# Patient Record
Sex: FEMALE | Race: WHITE | NOT HISPANIC OR LATINO | Employment: UNEMPLOYED | URBAN - NONMETROPOLITAN AREA
[De-identification: names, ages, dates, MRNs, and addresses within clinical notes are randomized per-mention and may not be internally consistent; named-entity substitution may affect disease eponyms.]

---

## 2017-02-14 ENCOUNTER — TRANSCRIBE ORDERS (OUTPATIENT)
Dept: ADMINISTRATIVE | Facility: HOSPITAL | Age: 82
End: 2017-02-14

## 2017-02-14 DIAGNOSIS — E04.9 ENLARGEMENT OF THYROID: Primary | ICD-10-CM

## 2017-02-27 ENCOUNTER — HOSPITAL ENCOUNTER (OUTPATIENT)
Dept: ULTRASOUND IMAGING | Facility: HOSPITAL | Age: 82
Discharge: HOME | End: 2017-02-27
Attending: INTERNAL MEDICINE | Admitting: INTERNAL MEDICINE

## 2017-02-27 DIAGNOSIS — E04.9 ENLARGEMENT OF THYROID: ICD-10-CM

## 2017-02-27 PROCEDURE — 76536 US EXAM OF HEAD AND NECK: CPT

## 2017-02-27 PROCEDURE — 76536 US EXAM OF HEAD AND NECK: CPT | Performed by: RADIOLOGY

## 2017-10-24 DIAGNOSIS — M25.551 HIP PAIN, RIGHT: Primary | ICD-10-CM

## 2017-10-25 ENCOUNTER — HOSPITAL ENCOUNTER (OUTPATIENT)
Dept: GENERAL RADIOLOGY | Facility: HOSPITAL | Age: 82
Discharge: HOME | End: 2017-10-25
Attending: ORTHOPAEDIC SURGERY | Admitting: ORTHOPAEDIC SURGERY

## 2017-10-25 ENCOUNTER — OFFICE VISIT (OUTPATIENT)
Dept: ORTHOPEDIC SURGERY | Facility: CLINIC | Age: 82
End: 2017-10-25

## 2017-10-25 VITALS
BODY MASS INDEX: 35 KG/M2 | HEART RATE: 73 BPM | WEIGHT: 205 LBS | DIASTOLIC BLOOD PRESSURE: 79 MMHG | SYSTOLIC BLOOD PRESSURE: 140 MMHG | HEIGHT: 64 IN

## 2017-10-25 DIAGNOSIS — M25.551 HIP PAIN, RIGHT: ICD-10-CM

## 2017-10-25 DIAGNOSIS — M65.331 TRIGGER MIDDLE FINGER OF RIGHT HAND: Primary | ICD-10-CM

## 2017-10-25 DIAGNOSIS — M79.641 RIGHT HAND PAIN: ICD-10-CM

## 2017-10-25 DIAGNOSIS — Z96.641 HISTORY OF HIP REPLACEMENT, TOTAL, RIGHT: ICD-10-CM

## 2017-10-25 PROCEDURE — 99214 OFFICE O/P EST MOD 30 MIN: CPT | Performed by: ORTHOPAEDIC SURGERY

## 2017-10-25 PROCEDURE — 73502 X-RAY EXAM HIP UNI 2-3 VIEWS: CPT | Performed by: RADIOLOGY

## 2017-10-25 PROCEDURE — 73502 X-RAY EXAM HIP UNI 2-3 VIEWS: CPT

## 2017-10-25 RX ORDER — MONTELUKAST SODIUM 10 MG/1
10 TABLET ORAL NIGHTLY
COMMUNITY
Start: 2017-10-10

## 2017-10-25 RX ORDER — SERTRALINE HYDROCHLORIDE 25 MG/1
TABLET, FILM COATED ORAL
COMMUNITY
Start: 2017-10-10 | End: 2017-10-31

## 2017-10-25 RX ORDER — POTASSIUM CHLORIDE 750 MG/1
CAPSULE, EXTENDED RELEASE ORAL
COMMUNITY
Start: 2017-10-10 | End: 2017-10-31

## 2017-10-25 RX ORDER — ASPIRIN 81 MG/1
81 TABLET ORAL DAILY
COMMUNITY

## 2017-10-25 RX ORDER — SODIUM CHLORIDE, SODIUM LACTATE, POTASSIUM CHLORIDE, CALCIUM CHLORIDE 600; 310; 30; 20 MG/100ML; MG/100ML; MG/100ML; MG/100ML
75 INJECTION, SOLUTION INTRAVENOUS CONTINUOUS
Status: CANCELLED | OUTPATIENT
Start: 2017-10-25

## 2017-10-25 NOTE — PROGRESS NOTES
History and Physical    Patient: Chanell Allen  YOB: 1932  Date of encounter: 10/25/2017      History of Present Illness:   Chanell Allen is a 85 y.o. female seen today for pain in her right hip and groin following a fall about a month ago. She does report gradual improvement and denies any hip pain today. She has a history of right total hip arthroplasty in April 2013.     Her second complaint today is pain and locking of her right middle finger.  She has had symptoms for over 6 months progressively worsening.  She has had multiple trigger finger releases in the past always with good result.  She is currently on Plavix and Aspirin 81mg.     PMH:   Patient Active Problem List   Diagnosis   • Coronary artery disease   • HLD (hyperlipidemia)   • BP (high blood pressure)   • Adult hypothyroidism     Past Medical History:   Diagnosis Date   • Acid reflux    • CHF (congestive heart failure)    • COPD (chronic obstructive pulmonary disease)    • Diabetes    • History of transfusion    • Hypertension    • Osteoarthritis    • Rheumatoid arthritis    • Stroke     tia   • Thyroid disease      PSH:  Past Surgical History:   Procedure Laterality Date   • ABDOMINAL SURGERY     • CARPAL TUNNEL RELEASE     • CHOLECYSTECTOMY     • EYE SURGERY Bilateral     cataracts   • HIATAL HERNIA REPAIR     • HYSTERECTOMY     • JOINT REPLACEMENT     • SKIN BIOPSY     • THYROID SURGERY     • TOTAL HIP ARTHROPLASTY     • TRIGGER FINGER RELEASE Right 10/13/2016    Procedure: RIGHT FOURTH TRIGGER FINGER RELEASE;  Surgeon: Edwin Solis MD;  Location: [facility];  Service:        Allergies:  Allergies   Allergen Reactions   • Ciprofloxacin Hcl Other (See Comments)     Pt states doesn't remember       Medications:     Current Outpatient Prescriptions:   •  aspirin 81 MG EC tablet, Take 81 mg by mouth Daily., Disp: , Rfl:   •  clopidogrel (PLAVIX) 75 MG tablet, 75 mg Daily., Disp: , Rfl:   •  COREG CR 20 MG 24 hr capsule, 20  "mg Daily., Disp: , Rfl:   •  flecainide (TAMBOCOR) 50 MG tablet, 50 mg 2 (Two) Times a Day., Disp: , Rfl:   •  losartan (COZAAR) 100 MG tablet, 100 mg Daily., Disp: , Rfl:   •  montelukast (SINGULAIR) 10 MG tablet, , Disp: , Rfl:   •  SYNTHROID 175 MCG tablet, 175 mcg Daily., Disp: , Rfl:   •  HYDROcodone-acetaminophen (NORCO) 7.5-325 MG per tablet, Take 1 tablet by mouth Every 4 (Four) Hours As Needed for moderate pain (4-6) (Pain)., Disp: 12 tablet, Rfl: 0  •  potassium chloride (MICRO-K) 10 MEQ CR capsule, , Disp: , Rfl:   •  sertraline (ZOLOFT) 25 MG tablet, , Disp: , Rfl:   •  spironolactone (ALDACTONE) 25 MG tablet, 12.5 mg Daily., Disp: , Rfl:     Social History:  Social History     Occupational History   • Not on file.     Social History Main Topics   • Smoking status: Never Smoker   • Smokeless tobacco: Never Used   • Alcohol use No   • Drug use: No   • Sexual activity: Defer      Social History     Social History Narrative       Family History:  Family History   Problem Relation Age of Onset   • Osteoporosis Mother    • Rheumatologic disease Sister    • Heart disease Maternal Grandfather        Review of Systems:   Review of Systems   Constitutional: Positive for activity change and fatigue.   HENT: Negative.    Eyes: Negative.    Respiratory: Positive for shortness of breath.    Cardiovascular: Positive for chest pain, palpitations and leg swelling.   Gastrointestinal: Positive for diarrhea.   Endocrine: Negative.    Genitourinary: Negative.    Musculoskeletal: Positive for back pain, gait problem, joint swelling and myalgias.   Skin: Negative.    Allergic/Immunologic: Negative.    Hematological: Bruises/bleeds easily.   Psychiatric/Behavioral: The patient is nervous/anxious.        Physical Exam:   General Appearance:    85 y.o. female  cooperative, in no acute distress.  Alert and oriented x 3,                   Vitals:    10/25/17 1451   BP: 140/79   Pulse: 73   Weight: 205 lb (93 kg)   Height: 64\" " (162.6 cm)          HEENT: Unremarkable      Neck: Supple without lymphadenopathy.      Chest: Clear to ascultation bilaterally. Normal respiratory effort.      Heart: Regular rate and rhythm. No murmurs noted.      Skin:  Warm and dry without any rash.      Musculoskeletal:  Upper Extremities: Right hand middle finger   Lower Extremities: Right hip examination     Radiology:     XR HIP WITH OR WITHOUT PELVIS 2-3 VIEWS RIGHT-      REASON FOR EXAM:  Right hip pain; M25.551-Pain in right hip.      COMPARISON: Today's exam is compared with a previous study from  05/18/2016.      FINDINGS:  A hip prosthesis is noted on the right. The alignment is  stable. There were no fractures or dislocations. There were no plain  film findings of loosening of the prosthesis.      IMPRESSION:  Negative postoperative right hip.          This report was finalized on 10/25/2017 2:32 PM by Dr. Manny Felipe II, MD.    Assessment:  85-year-old female with right third trigger finger of about 6 months duration.  I'm doubtful she will get complete response long term with steroid injection.  She prefers to have surgical release.    ICD-10-CM ICD-9-CM   1. Trigger middle finger of right hand M65.331 727.03   2. Right hand pain M79.641 729.5   3. Hip pain, right M25.551 719.45   4. History of hip replacement, total, right Z96.641 V43.64       Plan:  She will be scheduled at [facility] on 11/02/2017 for right middle trigger finger release under local anesthesia.     This document was signed by Edwin Solis MD.  10/25/31700:35 PM    Cc: Stella Khan MD

## 2017-10-26 ENCOUNTER — TELEPHONE (OUTPATIENT)
Dept: ORTHOPEDIC SURGERY | Facility: CLINIC | Age: 82
End: 2017-10-26

## 2017-10-29 ENCOUNTER — PREP FOR SURGERY (OUTPATIENT)
Dept: OTHER | Facility: HOSPITAL | Age: 82
End: 2017-10-29

## 2017-10-30 NOTE — H&P
History and Physical    Patient: Chanell Allen  YOB: 1932  Date of encounter: 10/25/2017      History of Present Illness:   Chanell Allen is a 85 y.o. female seen today for pain in her right hip and groin following a fall about a month ago. She does report gradual improvement and denies any hip pain today. She has a history of right total hip arthroplasty in April 2013.     Her second complaint today is pain and locking of her right middle finger.  She has had symptoms for over 6 months progressively worsening.  She has had multiple trigger finger releases in the past always with good result.  She is currently on Plavix and Aspirin 81mg.     PMH:   Patient Active Problem List   Diagnosis   • Coronary artery disease   • HLD (hyperlipidemia)   • BP (high blood pressure)   • Adult hypothyroidism     Past Medical History:   Diagnosis Date   • Acid reflux    • CHF (congestive heart failure)    • COPD (chronic obstructive pulmonary disease)    • Diabetes    • History of transfusion    • Hypertension    • Osteoarthritis    • Rheumatoid arthritis    • Stroke     tia   • Thyroid disease      PSH:  Past Surgical History:   Procedure Laterality Date   • ABDOMINAL SURGERY     • CARPAL TUNNEL RELEASE     • CHOLECYSTECTOMY     • EYE SURGERY Bilateral     cataracts   • HIATAL HERNIA REPAIR     • HYSTERECTOMY     • JOINT REPLACEMENT     • SKIN BIOPSY     • THYROID SURGERY     • TOTAL HIP ARTHROPLASTY     • TRIGGER FINGER RELEASE Right 10/13/2016    Procedure: RIGHT FOURTH TRIGGER FINGER RELEASE;  Surgeon: Edwin Solis MD;  Location[facility];  Service:        Allergies:  Allergies   Allergen Reactions   • Ciprofloxacin Hcl Other (See Comments)     Pt states doesn't remember       Medications:     Current Outpatient Prescriptions:   •  aspirin 81 MG EC tablet, Take 81 mg by mouth Daily., Disp: , Rfl:   •  clopidogrel (PLAVIX) 75 MG tablet, 75 mg Daily., Disp: , Rfl:   •  COREG CR 20 MG 24 hr capsule, 20  "mg Daily., Disp: , Rfl:   •  flecainide (TAMBOCOR) 50 MG tablet, 50 mg 2 (Two) Times a Day., Disp: , Rfl:   •  losartan (COZAAR) 100 MG tablet, 100 mg Daily., Disp: , Rfl:   •  montelukast (SINGULAIR) 10 MG tablet, , Disp: , Rfl:   •  SYNTHROID 175 MCG tablet, 175 mcg Daily., Disp: , Rfl:   •  HYDROcodone-acetaminophen (NORCO) 7.5-325 MG per tablet, Take 1 tablet by mouth Every 4 (Four) Hours As Needed for moderate pain (4-6) (Pain)., Disp: 12 tablet, Rfl: 0  •  potassium chloride (MICRO-K) 10 MEQ CR capsule, , Disp: , Rfl:   •  sertraline (ZOLOFT) 25 MG tablet, , Disp: , Rfl:   •  spironolactone (ALDACTONE) 25 MG tablet, 12.5 mg Daily., Disp: , Rfl:     Social History:  Social History     Occupational History   • Not on file.     Social History Main Topics   • Smoking status: Never Smoker   • Smokeless tobacco: Never Used   • Alcohol use No   • Drug use: No   • Sexual activity: Defer      Social History     Social History Narrative       Family History:  Family History   Problem Relation Age of Onset   • Osteoporosis Mother    • Rheumatologic disease Sister    • Heart disease Maternal Grandfather        Review of Systems:   Review of Systems   Constitutional: Positive for activity change and fatigue.   HENT: Negative.    Eyes: Negative.    Respiratory: Positive for shortness of breath.    Cardiovascular: Positive for chest pain, palpitations and leg swelling.   Gastrointestinal: Positive for diarrhea.   Endocrine: Negative.    Genitourinary: Negative.    Musculoskeletal: Positive for back pain, gait problem, joint swelling and myalgias.   Skin: Negative.    Allergic/Immunologic: Negative.    Hematological: Bruises/bleeds easily.   Psychiatric/Behavioral: The patient is nervous/anxious.        Physical Exam:   General Appearance:    85 y.o. female  cooperative, in no acute distress.  Alert and oriented x 3,                   Vitals:    10/25/17 1451   BP: 140/79   Pulse: 73   Weight: 205 lb (93 kg)   Height: 64\" " (162.6 cm)          HEENT: Unremarkable      Neck: Supple without lymphadenopathy.      Chest: Clear to ascultation bilaterally. Normal respiratory effort.      Heart: Regular rate and rhythm. No murmurs noted.      Skin:  Warm and dry without any rash.      Musculoskeletal:  Upper Extremities: Right hand middle finger   Lower Extremities: Right hip examination     Radiology:     XR HIP WITH OR WITHOUT PELVIS 2-3 VIEWS RIGHT-      REASON FOR EXAM:  Right hip pain; M25.551-Pain in right hip.      COMPARISON: Today's exam is compared with a previous study from  05/18/2016.      FINDINGS:  A hip prosthesis is noted on the right. The alignment is  stable. There were no fractures or dislocations. There were no plain  film findings of loosening of the prosthesis.      IMPRESSION:  Negative postoperative right hip.          This report was finalized on 10/25/2017 2:32 PM by Dr. Manny Felipe II, MD.    Assessment:  85-year-old female with right third trigger finger of about 6 months duration.  I'm doubtful she will get complete response long term with steroid injection.  She prefers to have surgical release.    ICD-10-CM ICD-9-CM   1. Trigger middle finger of right hand M65.331 727.03   2. Right hand pain M79.641 729.5   3. Hip pain, right M25.551 719.45   4. History of hip replacement, total, right Z96.641 V43.64       Plan:  She will be scheduled at [facility] on 11/02/2017 for right middle trigger finger release under local anesthesia.     This document was signed by Edwin Solis MD.  10/25/45154:35 PM    Cc: Stella Khan MD

## 2017-10-31 ENCOUNTER — APPOINTMENT (OUTPATIENT)
Dept: PREADMISSION TESTING | Facility: HOSPITAL | Age: 82
End: 2017-10-31

## 2017-10-31 LAB
ANION GAP SERPL CALCULATED.3IONS-SCNC: 5.7 MMOL/L (ref 3.6–11.2)
BUN BLD-MCNC: 16 MG/DL (ref 7–21)
BUN/CREAT SERPL: 18.8 (ref 7–25)
CALCIUM SPEC-SCNC: 9.6 MG/DL (ref 7.7–10)
CHLORIDE SERPL-SCNC: 110 MMOL/L (ref 99–112)
CO2 SERPL-SCNC: 27.3 MMOL/L (ref 24.3–31.9)
CREAT BLD-MCNC: 0.85 MG/DL (ref 0.43–1.29)
DEPRECATED RDW RBC AUTO: 41.3 FL (ref 37–54)
ERYTHROCYTE [DISTWIDTH] IN BLOOD BY AUTOMATED COUNT: 12.7 % (ref 11.5–14.5)
GFR SERPL CREATININE-BSD FRML MDRD: 64 ML/MIN/1.73
GLUCOSE BLD-MCNC: 95 MG/DL (ref 70–110)
HCT VFR BLD AUTO: 39.5 % (ref 37–47)
HGB BLD-MCNC: 12.7 G/DL (ref 12–16)
MCH RBC QN AUTO: 29.5 PG (ref 27–33)
MCHC RBC AUTO-ENTMCNC: 32.2 G/DL (ref 33–37)
MCV RBC AUTO: 91.9 FL (ref 80–94)
OSMOLALITY SERPL CALC.SUM OF ELEC: 286 MOSM/KG (ref 273–305)
PLATELET # BLD AUTO: 255 10*3/MM3 (ref 130–400)
PMV BLD AUTO: 10.6 FL (ref 6–10)
POTASSIUM BLD-SCNC: 4.3 MMOL/L (ref 3.5–5.3)
RBC # BLD AUTO: 4.3 10*6/MM3 (ref 4.2–5.4)
SODIUM BLD-SCNC: 143 MMOL/L (ref 135–153)
WBC NRBC COR # BLD: 5.78 10*3/MM3 (ref 4.5–12.5)

## 2017-10-31 PROCEDURE — 36415 COLL VENOUS BLD VENIPUNCTURE: CPT

## 2017-10-31 PROCEDURE — 80048 BASIC METABOLIC PNL TOTAL CA: CPT | Performed by: ORTHOPAEDIC SURGERY

## 2017-10-31 PROCEDURE — 85027 COMPLETE CBC AUTOMATED: CPT | Performed by: ORTHOPAEDIC SURGERY

## 2017-10-31 RX ORDER — ERGOCALCIFEROL 1.25 MG/1
50000 CAPSULE ORAL WEEKLY
COMMUNITY

## 2017-11-01 ENCOUNTER — TELEPHONE (OUTPATIENT)
Dept: ORTHOPEDIC SURGERY | Facility: CLINIC | Age: 82
End: 2017-11-01

## 2017-11-01 NOTE — TELEPHONE ENCOUNTER
Patient was notified with surgery arrival time 11/2/17@ 6:30a.m. Patient verbalized understanding.

## 2017-11-02 ENCOUNTER — ANESTHESIA (OUTPATIENT)
Dept: PERIOP | Facility: HOSPITAL | Age: 82
End: 2017-11-02

## 2017-11-02 ENCOUNTER — HOSPITAL ENCOUNTER (OUTPATIENT)
Facility: HOSPITAL | Age: 82
Setting detail: HOSPITAL OUTPATIENT SURGERY
Discharge: HOME | End: 2017-11-02
Attending: ORTHOPAEDIC SURGERY | Admitting: ORTHOPAEDIC SURGERY

## 2017-11-02 ENCOUNTER — ANESTHESIA EVENT (OUTPATIENT)
Dept: PERIOP | Facility: HOSPITAL | Age: 82
End: 2017-11-02

## 2017-11-02 VITALS
DIASTOLIC BLOOD PRESSURE: 64 MMHG | OXYGEN SATURATION: 96 % | HEIGHT: 64 IN | BODY MASS INDEX: 35 KG/M2 | WEIGHT: 205 LBS | TEMPERATURE: 99.3 F | RESPIRATION RATE: 16 BRPM | SYSTOLIC BLOOD PRESSURE: 118 MMHG | HEART RATE: 58 BPM

## 2017-11-02 DIAGNOSIS — M79.641 RIGHT HAND PAIN: ICD-10-CM

## 2017-11-02 DIAGNOSIS — M65.331 TRIGGER MIDDLE FINGER OF RIGHT HAND: ICD-10-CM

## 2017-11-02 LAB — GLUCOSE BLDC GLUCOMTR-MCNC: 104 MG/DL (ref 70–130)

## 2017-11-02 PROCEDURE — 25010000002 PROPOFOL 1000 MG/ML EMULSION: Performed by: NURSE ANESTHETIST, CERTIFIED REGISTERED

## 2017-11-02 PROCEDURE — 26055 INCISE FINGER TENDON SHEATH: CPT | Performed by: ORTHOPAEDIC SURGERY

## 2017-11-02 PROCEDURE — 82962 GLUCOSE BLOOD TEST: CPT

## 2017-11-02 PROCEDURE — 25010000002 ONDANSETRON PER 1 MG: Performed by: NURSE ANESTHETIST, CERTIFIED REGISTERED

## 2017-11-02 PROCEDURE — 25010000002 FENTANYL CITRATE (PF) 100 MCG/2ML SOLUTION: Performed by: NURSE ANESTHETIST, CERTIFIED REGISTERED

## 2017-11-02 PROCEDURE — 25010000002 PROPOFOL 10 MG/ML EMULSION: Performed by: NURSE ANESTHETIST, CERTIFIED REGISTERED

## 2017-11-02 RX ORDER — FENTANYL CITRATE 50 UG/ML
INJECTION, SOLUTION INTRAMUSCULAR; INTRAVENOUS AS NEEDED
Status: DISCONTINUED | OUTPATIENT
Start: 2017-11-02 | End: 2017-11-02 | Stop reason: SURG

## 2017-11-02 RX ORDER — SODIUM CHLORIDE, SODIUM LACTATE, POTASSIUM CHLORIDE, CALCIUM CHLORIDE 600; 310; 30; 20 MG/100ML; MG/100ML; MG/100ML; MG/100ML
75 INJECTION, SOLUTION INTRAVENOUS CONTINUOUS
Status: DISCONTINUED | OUTPATIENT
Start: 2017-11-02 | End: 2017-11-02 | Stop reason: HOSPADM

## 2017-11-02 RX ORDER — SODIUM CHLORIDE, SODIUM LACTATE, POTASSIUM CHLORIDE, CALCIUM CHLORIDE 600; 310; 30; 20 MG/100ML; MG/100ML; MG/100ML; MG/100ML
125 INJECTION, SOLUTION INTRAVENOUS CONTINUOUS
Status: DISCONTINUED | OUTPATIENT
Start: 2017-11-02 | End: 2017-11-02 | Stop reason: HOSPADM

## 2017-11-02 RX ORDER — MEPERIDINE HYDROCHLORIDE 25 MG/ML
12.5 INJECTION INTRAMUSCULAR; INTRAVENOUS; SUBCUTANEOUS
Status: DISCONTINUED | OUTPATIENT
Start: 2017-11-02 | End: 2017-11-02 | Stop reason: HOSPADM

## 2017-11-02 RX ORDER — IPRATROPIUM BROMIDE AND ALBUTEROL SULFATE 2.5; .5 MG/3ML; MG/3ML
3 SOLUTION RESPIRATORY (INHALATION) ONCE AS NEEDED
Status: DISCONTINUED | OUTPATIENT
Start: 2017-11-02 | End: 2017-11-02 | Stop reason: HOSPADM

## 2017-11-02 RX ORDER — ONDANSETRON 2 MG/ML
INJECTION INTRAMUSCULAR; INTRAVENOUS AS NEEDED
Status: DISCONTINUED | OUTPATIENT
Start: 2017-11-02 | End: 2017-11-02 | Stop reason: SURG

## 2017-11-02 RX ORDER — SODIUM CHLORIDE 0.9 % (FLUSH) 0.9 %
1-10 SYRINGE (ML) INJECTION AS NEEDED
Status: DISCONTINUED | OUTPATIENT
Start: 2017-11-02 | End: 2017-11-02 | Stop reason: HOSPADM

## 2017-11-02 RX ORDER — LIDOCAINE HYDROCHLORIDE 10 MG/ML
INJECTION, SOLUTION INFILTRATION; PERINEURAL AS NEEDED
Status: DISCONTINUED | OUTPATIENT
Start: 2017-11-02 | End: 2017-11-02 | Stop reason: HOSPADM

## 2017-11-02 RX ORDER — PROPOFOL 10 MG/ML
VIAL (ML) INTRAVENOUS AS NEEDED
Status: DISCONTINUED | OUTPATIENT
Start: 2017-11-02 | End: 2017-11-02 | Stop reason: SURG

## 2017-11-02 RX ORDER — BUPIVACAINE HYDROCHLORIDE 5 MG/ML
INJECTION, SOLUTION PERINEURAL AS NEEDED
Status: DISCONTINUED | OUTPATIENT
Start: 2017-11-02 | End: 2017-11-02 | Stop reason: HOSPADM

## 2017-11-02 RX ORDER — MAGNESIUM HYDROXIDE 1200 MG/15ML
LIQUID ORAL AS NEEDED
Status: DISCONTINUED | OUTPATIENT
Start: 2017-11-02 | End: 2017-11-02 | Stop reason: HOSPADM

## 2017-11-02 RX ORDER — FAMOTIDINE 10 MG/ML
INJECTION, SOLUTION INTRAVENOUS AS NEEDED
Status: DISCONTINUED | OUTPATIENT
Start: 2017-11-02 | End: 2017-11-02 | Stop reason: SURG

## 2017-11-02 RX ORDER — ONDANSETRON 2 MG/ML
4 INJECTION INTRAMUSCULAR; INTRAVENOUS ONCE AS NEEDED
Status: DISCONTINUED | OUTPATIENT
Start: 2017-11-02 | End: 2017-11-02 | Stop reason: HOSPADM

## 2017-11-02 RX ORDER — HYDROCODONE BITARTRATE AND ACETAMINOPHEN 5; 325 MG/1; MG/1
1 TABLET ORAL EVERY 4 HOURS PRN
Qty: 12 TABLET | Refills: 0 | Status: SHIPPED | OUTPATIENT
Start: 2017-11-02 | End: 2018-10-08

## 2017-11-02 RX ORDER — OXYCODONE HYDROCHLORIDE AND ACETAMINOPHEN 5; 325 MG/1; MG/1
1 TABLET ORAL ONCE AS NEEDED
Status: DISCONTINUED | OUTPATIENT
Start: 2017-11-02 | End: 2017-11-02 | Stop reason: HOSPADM

## 2017-11-02 RX ORDER — FENTANYL CITRATE 50 UG/ML
50 INJECTION, SOLUTION INTRAMUSCULAR; INTRAVENOUS
Status: DISCONTINUED | OUTPATIENT
Start: 2017-11-02 | End: 2017-11-02 | Stop reason: HOSPADM

## 2017-11-02 RX ADMIN — PROPOFOL 50 MG: 10 INJECTION, EMULSION INTRAVENOUS at 07:36

## 2017-11-02 RX ADMIN — FENTANYL CITRATE 50 MCG: 50 INJECTION INTRAMUSCULAR; INTRAVENOUS at 07:36

## 2017-11-02 RX ADMIN — PROPOFOL 100 MCG/KG/MIN: 10 INJECTION, EMULSION INTRAVENOUS at 07:36

## 2017-11-02 RX ADMIN — ONDANSETRON 4 MG: 2 INJECTION, SOLUTION INTRAMUSCULAR; INTRAVENOUS at 07:29

## 2017-11-02 RX ADMIN — FAMOTIDINE 20 MG: 10 INJECTION, SOLUTION INTRAVENOUS at 07:29

## 2017-11-02 RX ADMIN — FENTANYL CITRATE 50 MCG: 50 INJECTION INTRAMUSCULAR; INTRAVENOUS at 07:29

## 2017-11-02 RX ADMIN — SODIUM CHLORIDE, POTASSIUM CHLORIDE, SODIUM LACTATE AND CALCIUM CHLORIDE 75 ML/HR: 600; 310; 30; 20 INJECTION, SOLUTION INTRAVENOUS at 07:17

## 2017-11-02 NOTE — PLAN OF CARE
Problem: Patient Care Overview (Adult)  Goal: Discharge Needs Assessment  Outcome: Ongoing (interventions implemented as appropriate)    11/02/17 0636   Discharge Needs Assessment   Concerns To Be Addressed no discharge needs identified   Readmission Within The Last 30 Days no previous admission in last 30 days   Equipment Needed After Discharge none   Discharge Disposition home or self-care   Current Health   Anticipated Changes Related to Illness none   Self-Care   Equipment Currently Used at Home none   Living Environment   Transportation Available none

## 2017-11-02 NOTE — H&P (VIEW-ONLY)
History and Physical    Patient: Chanell Allen  YOB: 1932  Date of encounter: 10/25/2017      History of Present Illness:   Chanell Allen is a 85 y.o. female seen today for pain in her right hip and groin following a fall about a month ago. She does report gradual improvement and denies any hip pain today. She has a history of right total hip arthroplasty in April 2013.     Her second complaint today is pain and locking of her right middle finger.  She has had symptoms for over 6 months progressively worsening.  She has had multiple trigger finger releases in the past always with good result.  She is currently on Plavix and Aspirin 81mg.     PMH:   Patient Active Problem List   Diagnosis   • Coronary artery disease   • HLD (hyperlipidemia)   • BP (high blood pressure)   • Adult hypothyroidism     Past Medical History:   Diagnosis Date   • Acid reflux    • CHF (congestive heart failure)    • COPD (chronic obstructive pulmonary disease)    • Diabetes    • History of transfusion    • Hypertension    • Osteoarthritis    • Rheumatoid arthritis    • Stroke     tia   • Thyroid disease      PSH:  Past Surgical History:   Procedure Laterality Date   • ABDOMINAL SURGERY     • CARPAL TUNNEL RELEASE     • CHOLECYSTECTOMY     • EYE SURGERY Bilateral     cataracts   • HIATAL HERNIA REPAIR     • HYSTERECTOMY     • JOINT REPLACEMENT     • SKIN BIOPSY     • THYROID SURGERY     • TOTAL HIP ARTHROPLASTY     • TRIGGER FINGER RELEASE Right 10/13/2016    Procedure: RIGHT FOURTH TRIGGER FINGER RELEASE;  Surgeon: Edwin Solis MD;  Location: [facility];  Service:        Allergies:  Allergies   Allergen Reactions   • Ciprofloxacin Hcl Other (See Comments)     Pt states doesn't remember       Medications:     Current Outpatient Prescriptions:   •  aspirin 81 MG EC tablet, Take 81 mg by mouth Daily., Disp: , Rfl:   •  clopidogrel (PLAVIX) 75 MG tablet, 75 mg Daily., Disp: , Rfl:   •  COREG CR 20 MG 24 hr capsule, 20  "mg Daily., Disp: , Rfl:   •  flecainide (TAMBOCOR) 50 MG tablet, 50 mg 2 (Two) Times a Day., Disp: , Rfl:   •  losartan (COZAAR) 100 MG tablet, 100 mg Daily., Disp: , Rfl:   •  montelukast (SINGULAIR) 10 MG tablet, , Disp: , Rfl:   •  SYNTHROID 175 MCG tablet, 175 mcg Daily., Disp: , Rfl:   •  HYDROcodone-acetaminophen (NORCO) 7.5-325 MG per tablet, Take 1 tablet by mouth Every 4 (Four) Hours As Needed for moderate pain (4-6) (Pain)., Disp: 12 tablet, Rfl: 0  •  potassium chloride (MICRO-K) 10 MEQ CR capsule, , Disp: , Rfl:   •  sertraline (ZOLOFT) 25 MG tablet, , Disp: , Rfl:   •  spironolactone (ALDACTONE) 25 MG tablet, 12.5 mg Daily., Disp: , Rfl:     Social History:  Social History     Occupational History   • Not on file.     Social History Main Topics   • Smoking status: Never Smoker   • Smokeless tobacco: Never Used   • Alcohol use No   • Drug use: No   • Sexual activity: Defer      Social History     Social History Narrative       Family History:  Family History   Problem Relation Age of Onset   • Osteoporosis Mother    • Rheumatologic disease Sister    • Heart disease Maternal Grandfather        Review of Systems:   Review of Systems   Constitutional: Positive for activity change and fatigue.   HENT: Negative.    Eyes: Negative.    Respiratory: Positive for shortness of breath.    Cardiovascular: Positive for chest pain, palpitations and leg swelling.   Gastrointestinal: Positive for diarrhea.   Endocrine: Negative.    Genitourinary: Negative.    Musculoskeletal: Positive for back pain, gait problem, joint swelling and myalgias.   Skin: Negative.    Allergic/Immunologic: Negative.    Hematological: Bruises/bleeds easily.   Psychiatric/Behavioral: The patient is nervous/anxious.        Physical Exam:   General Appearance:    85 y.o. female  cooperative, in no acute distress.  Alert and oriented x 3,                   Vitals:    10/25/17 1451   BP: 140/79   Pulse: 73   Weight: 205 lb (93 kg)   Height: 64\" " (162.6 cm)          HEENT: Unremarkable      Neck: Supple without lymphadenopathy.      Chest: Clear to ascultation bilaterally. Normal respiratory effort.      Heart: Regular rate and rhythm. No murmurs noted.      Skin:  Warm and dry without any rash.      Musculoskeletal:  Upper Extremities: Right hand middle finger   Lower Extremities: Right hip examination     Radiology:     XR HIP WITH OR WITHOUT PELVIS 2-3 VIEWS RIGHT-      REASON FOR EXAM:  Right hip pain; M25.551-Pain in right hip.      COMPARISON: Today's exam is compared with a previous study from  05/18/2016.      FINDINGS:  A hip prosthesis is noted on the right. The alignment is  stable. There were no fractures or dislocations. There were no plain  film findings of loosening of the prosthesis.      IMPRESSION:  Negative postoperative right hip.          This report was finalized on 10/25/2017 2:32 PM by Dr. Manny Felipe II, MD.    Assessment:  85-year-old female with right third trigger finger of about 6 months duration.  I'm doubtful she will get complete response long term with steroid injection.  She prefers to have surgical release.    ICD-10-CM ICD-9-CM   1. Trigger middle finger of right hand M65.331 727.03   2. Right hand pain M79.641 729.5   3. Hip pain, right M25.551 719.45   4. History of hip replacement, total, right Z96.641 V43.64       Plan:  She will be scheduled at [facility] on 11/02/2017 for right middle trigger finger release under local anesthesia.     This document was signed by Edwin Solis MD.  10/25/24127:35 PM    Cc: Stella Khan MD

## 2017-11-02 NOTE — PLAN OF CARE
Problem: Patient Care Overview (Adult)  Goal: Plan of Care Review  Outcome: Ongoing (interventions implemented as appropriate)    11/02/17 0637   Coping/Psychosocial Response Interventions   Plan Of Care Reviewed With patient   Patient Care Overview   Progress progress toward functional goals as expected

## 2017-11-02 NOTE — PLAN OF CARE
Problem: Perioperative Period (Adult)  Goal: Signs and Symptoms of Listed Potential Problems Will be Absent or Manageable (Perioperative Period)  Outcome: Ongoing (interventions implemented as appropriate)    11/02/17 0635   Perioperative Period   Problems Assessed (Perioperative Period) pain   Problems Present (Perioperative Period) none

## 2017-11-02 NOTE — ANESTHESIA PREPROCEDURE EVALUATION
Anesthesia Evaluation     Patient summary reviewed and Nursing notes reviewed   no history of anesthetic complications:  NPO Solid Status: > 8 hours  NPO Liquid Status: > 8 hours     Airway   Mallampati: II  TM distance: >3 FB  Neck ROM: full  no difficulty expected  Dental - normal exam   (+) edentulous, upper dentures and lower dentures    Pulmonary - normal exam    breath sounds clear to auscultation  (+) COPD,   (-) asthma, shortness of breath, not a smoker  Cardiovascular - normal exam  Exercise tolerance: good (4-7 METS)    NYHA Classification: II  Rhythm: regular  Rate: normal    (+) hypertension well controlled, CAD, CHF, hyperlipidemia  (-) angina      Neuro/Psych- negative ROS  (-) seizuresCVA: denies   GI/Hepatic/Renal/Endo    (+) obesity, morbid obesity, GERD well controlled, hypothyroidism,   (-) diabetes    Musculoskeletal     Abdominal  - normal exam    Bowel sounds: normal.   Substance History - negative use     OB/GYN negative ob/gyn ROS         Other   (+) arthritis                                     Anesthesia Plan    ASA 3     MAC     intravenous induction   Anesthetic plan and risks discussed with patient.    Plan discussed with CRNA.

## 2017-11-02 NOTE — BRIEF OP NOTE
FINGER TRIGGER RELEASE  Progress Note    Chanell J Alejandro  11/2/2017    Pre-op Diagnosis:   Right hand pain [M79.641]  Trigger middle finger of right hand [M65.331]       Post-Op Diagnosis Codes:     * Right hand pain [M79.641]     * Trigger middle finger of right hand [M65.331]    Procedure/CPT® Codes:      Procedure(s):   RIGHT MIDDLE TRIGGER FINGER RELEASE    Surgeon(s):  Edwin Solis MD    Anesthesia: Local/general  Staff:   Circulator: Jurgen Freitas RN  Scrub Person: Vandana Alcantara  Assistant: Maryjane Sterling    Estimated Blood Loss:     Urine Voided: * No values recorded between 11/2/2017  7:30 AM and 11/2/2017  8:05 AM *    Specimens:                      Drains:           Findings:     Complications:     Edwin Solis MD     Date: 11/2/2017  Time: 8:05 AM

## 2017-11-02 NOTE — ANESTHESIA POSTPROCEDURE EVALUATION
Patient: Chanell Allen    Procedure Summary     Date Anesthesia Start Anesthesia Stop Room / Location    11/02/17 0729 0805  [facility] OR 03 / [facility]       Procedure Diagnosis Surgeon Provider     RIGHT MIDDLE TRIGGER FINGER RELEASE (Right Fingers) Right hand pain; Trigger middle finger of right hand  (Right hand pain [M79.641]; Trigger middle finger of right hand [M65.331]) MD Jackson Peña Depa, DO          Anesthesia Type: MAC  Last vitals  BP   106/50 (11/02/17 0823)   Temp   99.3 °F (37.4 °C) (11/02/17 0806)   Pulse   57 (11/02/17 0823)   Resp   14 (11/02/17 0823)     SpO2   98 % (11/02/17 0823)     Post Anesthesia Care and Evaluation    Patient location during evaluation: PHASE II  Patient participation: complete - patient participated  Level of consciousness: awake and alert and awake  Pain score: 0  Pain management: adequate  Airway patency: patent  Anesthetic complications: No anesthetic complications  PONV Status: none  Cardiovascular status: acceptable  Respiratory status: acceptable and room air  Hydration status: balanced  No anesthesia care post op

## 2017-11-02 NOTE — OP NOTE
DATE OF SURGERY: 11/02/17    PREOPERATIVE DIAGNOSIS: Right third trigger finger     POSTOPERATIVE DIAGNOSIS: Same     OPERATIONS PERFORMED: Release right third trigger finger     SURGEON: Edwin Solis MD      ASSISTANT: Maryjane Sterling     ANESTHESIA: Local/general     ESTIMATED BLOOD LOSS: None     ANTIBIOTICS: None     DESCRIPTION OF PROCEDURE: With the patient in operating theater under IV sedation the right hand and arm were sterilely prepped and draped in usual manner.  Palmar aspect right hand directly over the A1 pulley was injected with 2 cc 0.5 Marcaine.  With the extremity exsanguinated and the tourniquet inflated longitudinal incision was made 1 cm in length.  With soft tissue reflected and the flexor tendon sheath exposed the A1 pulley was identified and divided.  The third finger was taken through full excursion.  Complete release of the triggering was confirmed.  Tourniquet was deflated hemostasis obtained the wound closed in a single layer with 3-0 nylon vertical mattress suture sterile dressing applied and patient taken to recovery room in stable condition.       Dictator Signature:___________________________  Edwin Solis M.D.          Dr. Khan

## 2017-11-13 ENCOUNTER — OFFICE VISIT (OUTPATIENT)
Dept: ORTHOPEDIC SURGERY | Facility: CLINIC | Age: 82
End: 2017-11-13

## 2017-11-13 VITALS — HEIGHT: 64 IN | BODY MASS INDEX: 35 KG/M2 | WEIGHT: 205 LBS

## 2017-11-13 DIAGNOSIS — M65.331 TRIGGER MIDDLE FINGER OF RIGHT HAND: Primary | ICD-10-CM

## 2017-11-13 PROCEDURE — 99024 POSTOP FOLLOW-UP VISIT: CPT | Performed by: ORTHOPAEDIC SURGERY

## 2017-11-13 NOTE — PROGRESS NOTES
"Chanell Allen   :1932    Date of encounter:2017        HPI:  Chanell Allen is a 85 y.o. female who presents here today for follow-up of right third trigger finger release.  Date of surgery was 2017.  She's now 11 days postop.  She states she is doing well with no further complaints of pain or locking sensation.  She states that she is able to move the finger now with no further complaints.  She denies paresthesias.    PMH:   Patient Active Problem List   Diagnosis   • Coronary artery disease   • HLD (hyperlipidemia)   • BP (high blood pressure)   • Adult hypothyroidism   • Right hand pain   • Trigger middle finger of right hand       Exam:  General Appearance:    85 y.o. female  cooperative, in no acute distress.  Alert and oriented x 3,                   Vitals:    17 0832   Weight: 205 lb (93 kg)   Height: 64\" (162.6 cm)       Examination of the right hand reveals a clean and dry incision.  There is no surrounding erythema or active drainage.  She is good motion.  Her neurovascular status is intact.    Assessment    ICD-10-CM ICD-9-CM   1. Trigger middle finger of right hand M65.331 727.03       Plan:   An 85-year-old female 11 days status post right third trigger finger release.  Her incision looks good without signs of infection.  Sutures were removed today.  She has advised to slowly ease back into activities as tolerated.  She'll return back here on an as-needed basis.    Written by, Romana SIMPSON, acting as a scribe for Dr. Solis      CC: Dr. Khan               "

## 2017-11-28 ENCOUNTER — TRANSCRIBE ORDERS (OUTPATIENT)
Dept: ADMINISTRATIVE | Facility: HOSPITAL | Age: 82
End: 2017-11-28

## 2017-11-28 ENCOUNTER — HOSPITAL ENCOUNTER (OUTPATIENT)
Dept: GENERAL RADIOLOGY | Facility: HOSPITAL | Age: 82
Discharge: HOME | End: 2017-11-28
Attending: INTERNAL MEDICINE | Admitting: INTERNAL MEDICINE

## 2017-11-28 DIAGNOSIS — M79.604 RIGHT LEG PAIN: ICD-10-CM

## 2017-11-28 DIAGNOSIS — M79.604 RIGHT LEG PAIN: Primary | ICD-10-CM

## 2017-11-28 PROCEDURE — 73552 X-RAY EXAM OF FEMUR 2/>: CPT

## 2017-11-28 PROCEDURE — 73552 X-RAY EXAM OF FEMUR 2/>: CPT | Performed by: RADIOLOGY

## 2018-10-08 ENCOUNTER — OFFICE VISIT (OUTPATIENT)
Dept: ORTHOPEDIC SURGERY | Facility: CLINIC | Age: 83
End: 2018-10-08

## 2018-10-08 VITALS — SYSTOLIC BLOOD PRESSURE: 150 MMHG | HEART RATE: 70 BPM | DIASTOLIC BLOOD PRESSURE: 100 MMHG

## 2018-10-08 DIAGNOSIS — M65.352 TRIGGER LITTLE FINGER OF LEFT HAND: Primary | ICD-10-CM

## 2018-10-08 PROCEDURE — 99213 OFFICE O/P EST LOW 20 MIN: CPT | Performed by: ORTHOPAEDIC SURGERY

## 2018-10-08 PROCEDURE — 20550 NJX 1 TENDON SHEATH/LIGAMENT: CPT | Performed by: ORTHOPAEDIC SURGERY

## 2018-10-08 RX ADMIN — LIDOCAINE HYDROCHLORIDE 4 ML: 10 INJECTION, SOLUTION INFILTRATION; PERINEURAL at 10:44

## 2018-10-08 RX ADMIN — METHYLPREDNISOLONE ACETATE 40 MG: 40 INJECTION, SUSPENSION INTRA-ARTICULAR; INTRALESIONAL; INTRAMUSCULAR; SOFT TISSUE at 10:44

## 2018-10-08 NOTE — PROGRESS NOTES
Follow-up Visit         Patient: Chanell Allen  YOB: 1932  Date of Encounter: 10/08/2018      Chief  Complaint:   Chief Complaint   Patient presents with   • Left Hand - Pain         HPI:  Chanell Allen, 86 y.o. female presents today with pain in her left hand and locking of her fifth finger.  She's had symptoms for several weeks now.  She's had multiple trigger fingers in the past which required surgical release.  Her left fifth finger feels similar to those.  She denies weakness in her fifth finger or numbness.    Medical History:  Patient Active Problem List   Diagnosis   • Coronary artery disease   • HLD (hyperlipidemia)   • BP (high blood pressure)   • Adult hypothyroidism   • Right hand pain   • Trigger middle finger of right hand     Past Medical History:   Diagnosis Date   • Acid reflux    • Anemia    • CHF (congestive heart failure) (CMS/HCC)    • COPD (chronic obstructive pulmonary disease) (CMS/HCC)    • Coronary artery disease    • Diabetes (CMS/HCC)    • History of transfusion    • Hypertension    • Osteoarthritis    • Rheumatoid arthritis (CMS/HCC)    • Thyroid disease    • Trigger finger, right middle finger        Social History:  Social History     Social History   • Marital status:      Spouse name: N/A   • Number of children: N/A   • Years of education: N/A     Occupational History   • Not on file.     Social History Main Topics   • Smoking status: Never Smoker   • Smokeless tobacco: Never Used   • Alcohol use No   • Drug use: No   • Sexual activity: Defer     Other Topics Concern   • Not on file     Social History Narrative   • No narrative on file       Surgical History:  Past Surgical History:   Procedure Laterality Date   • ABDOMINAL SURGERY     • CARPAL TUNNEL RELEASE     • CHOLECYSTECTOMY     • EYE SURGERY Bilateral     cataracts   • HIATAL HERNIA REPAIR     • HYSTERECTOMY     • JOINT REPLACEMENT     • KNEE ARTHROSCOPY Left    • SKIN BIOPSY     • THYROID SURGERY      • TOTAL HIP ARTHROPLASTY Right    • TRIGGER FINGER RELEASE Right 10/13/2016    Procedure: RIGHT FOURTH TRIGGER FINGER RELEASE;  Surgeon: Sandip Solis MD;  Location:  [facility];  Service:    • TRIGGER FINGER RELEASE Right 11/2/2017    Procedure:  RIGHT MIDDLE TRIGGER FINGER RELEASE;  Surgeon: Sandip Solis MD;  Location:  [facility];  Service:      Examination:   Left hand evaluation reveals palpable nodule within the palmar aspect in line with the fifth finger.  She has catching sensation but without definite locking.  Neurovascular grossly intact.      Assessment & Plan:   86 y.o. female with left hand trigger finger fifth.  Today we discussed her options and agreed to provide her steroid injection she was given Depo-Medrol 40 mg with lidocaine block within the flexor tendon sheath left fifth.  He will follow-up as needed.         Diagnosis Plan   1. Trigger little finger of left hand         Flexor tendon sheath left fifth finger  Date/Time: 10/8/2018 10:44 AM  Performed by: SANDIP SOLIS  Authorized by: SANDIP SOLIS   Consent: Verbal consent obtained.  Risks and benefits: risks, benefits and alternatives were discussed  Consent given by: patient  Patient understanding: patient states understanding of the procedure being performed  Patient identity confirmed: verbally with patient  Preparation: Patient was prepped and draped in the usual sterile fashion.  Local anesthesia used: yes  Anesthesia: local infiltration    Anesthesia:  Local anesthesia used: yes    Sedation:  Patient sedated: no  Patient tolerance: Patient tolerated the procedure well with no immediate complications  Medications administered: 40 mg methylPREDNISolone acetate 40 MG/ML; 4 mL lidocaine 1 %              Cc:  Stella Khan MD      Scribed for Sandip Solis MD by Valentina Solis RN.10:53 AM 10/08/2018

## 2018-10-10 RX ORDER — METHYLPREDNISOLONE ACETATE 40 MG/ML
40 INJECTION, SUSPENSION INTRA-ARTICULAR; INTRALESIONAL; INTRAMUSCULAR; SOFT TISSUE
Status: COMPLETED | OUTPATIENT
Start: 2018-10-08 | End: 2018-10-08

## 2018-10-10 RX ORDER — LIDOCAINE HYDROCHLORIDE 10 MG/ML
4 INJECTION, SOLUTION INFILTRATION; PERINEURAL
Status: COMPLETED | OUTPATIENT
Start: 2018-10-08 | End: 2018-10-08

## 2019-02-19 ENCOUNTER — HOSPITAL ENCOUNTER (OUTPATIENT)
Dept: GENERAL RADIOLOGY | Facility: HOSPITAL | Age: 84
Discharge: HOME | End: 2019-02-19
Admitting: INTERNAL MEDICINE

## 2019-02-19 ENCOUNTER — TRANSCRIBE ORDERS (OUTPATIENT)
Dept: GENERAL RADIOLOGY | Facility: HOSPITAL | Age: 84
End: 2019-02-19

## 2019-02-19 DIAGNOSIS — R06.02 SHORTNESS OF BREATH: Primary | ICD-10-CM

## 2019-02-19 DIAGNOSIS — R06.02 SHORTNESS OF BREATH: ICD-10-CM

## 2019-02-19 PROCEDURE — 71046 X-RAY EXAM CHEST 2 VIEWS: CPT | Performed by: RADIOLOGY

## 2019-02-19 PROCEDURE — 71046 X-RAY EXAM CHEST 2 VIEWS: CPT

## 2019-02-22 ENCOUNTER — TRANSCRIBE ORDERS (OUTPATIENT)
Dept: ADMINISTRATIVE | Facility: HOSPITAL | Age: 84
End: 2019-02-22

## 2019-02-22 DIAGNOSIS — R06.02 SHORTNESS OF BREATH: Primary | ICD-10-CM

## 2019-02-26 ENCOUNTER — HOSPITAL ENCOUNTER (OUTPATIENT)
Dept: RESPIRATORY THERAPY | Facility: HOSPITAL | Age: 84
Discharge: HOME | End: 2019-02-26
Admitting: INTERNAL MEDICINE

## 2019-02-26 DIAGNOSIS — R06.02 SHORTNESS OF BREATH: ICD-10-CM

## 2019-02-26 PROCEDURE — 94727 GAS DIL/WSHOT DETER LNG VOL: CPT

## 2019-02-26 PROCEDURE — 94729 DIFFUSING CAPACITY: CPT | Performed by: INTERNAL MEDICINE

## 2019-02-26 PROCEDURE — 94729 DIFFUSING CAPACITY: CPT

## 2019-02-26 PROCEDURE — 94727 GAS DIL/WSHOT DETER LNG VOL: CPT | Performed by: INTERNAL MEDICINE

## 2019-02-26 PROCEDURE — 94010 BREATHING CAPACITY TEST: CPT | Performed by: INTERNAL MEDICINE

## 2019-02-26 PROCEDURE — 94010 BREATHING CAPACITY TEST: CPT

## 2019-02-27 ENCOUNTER — APPOINTMENT (OUTPATIENT)
Dept: RESPIRATORY THERAPY | Facility: HOSPITAL | Age: 84
End: 2019-02-27

## 2019-02-28 ENCOUNTER — LAB REQUISITION (OUTPATIENT)
Dept: LAB | Facility: HOSPITAL | Age: 84
End: 2019-02-28

## 2019-02-28 DIAGNOSIS — R89.9 UNSPECIFIED ABNORMAL FINDING IN SPECIMENS FROM OTHER ORGANS, SYSTEMS AND TISSUES: ICD-10-CM

## 2019-02-28 LAB
COLLECT DURATION TIME UR: 24 HRS
MICRO TOTAL PROTEIN: 11.1 MG/DL
MICROALBUMIN 24H MFR UR: 172.05 MG/24 HR (ref 0–29)
SPECIMEN VOL 24H UR: 1550 ML

## 2019-02-28 PROCEDURE — 84156 ASSAY OF PROTEIN URINE: CPT | Performed by: INTERNAL MEDICINE

## 2019-02-28 PROCEDURE — 81050 URINALYSIS VOLUME MEASURE: CPT | Performed by: INTERNAL MEDICINE

## 2019-07-08 ENCOUNTER — OFFICE VISIT (OUTPATIENT)
Dept: ORTHOPEDIC SURGERY | Facility: CLINIC | Age: 84
End: 2019-07-08

## 2019-07-08 VITALS — HEIGHT: 64 IN | BODY MASS INDEX: 32.44 KG/M2 | WEIGHT: 190 LBS

## 2019-07-08 DIAGNOSIS — M65.352 TRIGGER LITTLE FINGER OF LEFT HAND: Primary | ICD-10-CM

## 2019-07-08 PROCEDURE — 20550 NJX 1 TENDON SHEATH/LIGAMENT: CPT | Performed by: ORTHOPAEDIC SURGERY

## 2019-07-08 RX ADMIN — METHYLPREDNISOLONE ACETATE 40 MG: 40 INJECTION, SUSPENSION INTRA-ARTICULAR; INTRALESIONAL; INTRAMUSCULAR; SOFT TISSUE at 17:46

## 2019-07-08 RX ADMIN — LIDOCAINE HYDROCHLORIDE 5 ML: 20 INJECTION, SOLUTION INFILTRATION; PERINEURAL at 17:46

## 2019-07-08 NOTE — PROGRESS NOTES
Follow-up Visit         Patient: Chanell Allen  YOB: 1932  Date of Encounter: 07/08/2019      Chief  Complaint:   Chief Complaint   Patient presents with   • Left Hand - Pain, Follow-up     Trigger finger 5th          HPI:  Chanell Allen, 87 y.o. female turns in follow-up today with her left left hand complaints.  She was last seen October 8 of 2018 and received steroid injection flexor tendon sheath left fifth finger.  She had good response of nearly months.  Symptoms are slowly returning she now has locking again is requesting repeat injection.    Medical History:  Patient Active Problem List   Diagnosis   • Coronary artery disease   • HLD (hyperlipidemia)   • BP (high blood pressure)   • Adult hypothyroidism   • Right hand pain   • Trigger middle finger of right hand     Past Medical History:   Diagnosis Date   • Acid reflux    • Anemia    • CHF (congestive heart failure) (CMS/HCC)    • COPD (chronic obstructive pulmonary disease) (CMS/HCC)    • Coronary artery disease    • Diabetes (CMS/HCC)    • History of transfusion    • Hypertension    • Osteoarthritis    • Rheumatoid arthritis (CMS/HCC)    • Thyroid disease    • Trigger finger, right middle finger      Patient's Body mass index is 32.6 kg/m². BMI is above normal parameters. Recommendations include: educational material.      Social History:  Social History     Socioeconomic History   • Marital status:      Spouse name: Not on file   • Number of children: Not on file   • Years of education: Not on file   • Highest education level: Not on file   Tobacco Use   • Smoking status: Never Smoker   • Smokeless tobacco: Never Used   Substance and Sexual Activity   • Alcohol use: No   • Drug use: No   • Sexual activity: Defer       Surgical History:  Past Surgical History:   Procedure Laterality Date   • ABDOMINAL SURGERY     • CARPAL TUNNEL RELEASE     • CHOLECYSTECTOMY     • EYE SURGERY Bilateral     cataracts   • HIATAL HERNIA REPAIR      • HYSTERECTOMY     • JOINT REPLACEMENT     • KNEE ARTHROSCOPY Left    • SKIN BIOPSY     • THYROID SURGERY     • TOTAL HIP ARTHROPLASTY Right    • TRIGGER FINGER RELEASE Right 10/13/2016    Procedure: RIGHT FOURTH TRIGGER FINGER RELEASE;  Surgeon: Edwin Solis MD;  Location:  [facility];  Service:    • TRIGGER FINGER RELEASE Right 11/2/2017    Procedure:  RIGHT MIDDLE TRIGGER FINGER RELEASE;  Surgeon: Edwin Solis MD;  Location:  [facility];  Service:            Examination: Hand evaluation reveals obvious locking of the fourth finger with palpable nodule and tenderness along the A1 pulley of the fifth finger.      Assessment & Plan:   87 y.o. female obvious left fifth trigger finger today she is given steroid injection flexor tendon sheath left fifth finger with good response.  She will follow-up as needed.         Diagnosis Plan   1. Trigger little finger of left hand         FLEXOR TENDON SHEATH LEFT 5TH  Date/Time: 7/8/2019 5:46 PM  Performed by: Edwin Solis MD  Authorized by: Edwin Solis MD   Consent: Verbal consent obtained.  Risks and benefits: risks, benefits and alternatives were discussed  Consent given by: patient  Patient understanding: patient states understanding of the procedure being performed  Test results: test results available and properly labeled  Site marked: the operative site was marked  Imaging studies: imaging studies available  Patient identity confirmed: verbally with patient  Preparation: Patient was prepped and draped in the usual sterile fashion.  Local anesthesia used: yes  Anesthesia: local infiltration    Anesthesia:  Local anesthesia used: yes  Anesthetic total: 5 mL    Sedation:  Patient sedated: no    Patient tolerance: Patient tolerated the procedure well with no immediate complications  Medications administered: 5 mL lidocaine 2%; 40 mg methylPREDNISolone acetate 40 MG/ML              Cc:  Stella Khan MD            This document  has been electronically signed by Edwin Solis MD   July 11, 2019 12:17 PM

## 2019-07-15 RX ORDER — LIDOCAINE HYDROCHLORIDE 20 MG/ML
5 INJECTION, SOLUTION INFILTRATION; PERINEURAL
Status: COMPLETED | OUTPATIENT
Start: 2019-07-08 | End: 2019-07-08

## 2019-07-15 RX ORDER — METHYLPREDNISOLONE ACETATE 40 MG/ML
40 INJECTION, SUSPENSION INTRA-ARTICULAR; INTRALESIONAL; INTRAMUSCULAR; SOFT TISSUE
Status: COMPLETED | OUTPATIENT
Start: 2019-07-08 | End: 2019-07-08

## 2020-01-22 ENCOUNTER — TRANSCRIBE ORDERS (OUTPATIENT)
Dept: ADMINISTRATIVE | Facility: HOSPITAL | Age: 85
End: 2020-01-22

## 2020-01-22 DIAGNOSIS — E04.9 ENLARGED THYROID GLAND: Primary | ICD-10-CM

## 2020-01-30 ENCOUNTER — HOSPITAL ENCOUNTER (OUTPATIENT)
Dept: ULTRASOUND IMAGING | Facility: HOSPITAL | Age: 85
Discharge: HOME | End: 2020-01-30
Admitting: INTERNAL MEDICINE

## 2020-01-30 DIAGNOSIS — E04.9 ENLARGED THYROID GLAND: ICD-10-CM

## 2020-01-30 PROCEDURE — 76536 US EXAM OF HEAD AND NECK: CPT

## 2020-01-30 PROCEDURE — 76536 US EXAM OF HEAD AND NECK: CPT | Performed by: RADIOLOGY

## 2021-01-29 ENCOUNTER — IMMUNIZATION (OUTPATIENT)
Dept: VACCINE CLINIC | Facility: HOSPITAL | Age: 86
End: 2021-01-29

## 2021-01-29 PROCEDURE — 0001A: CPT | Performed by: FAMILY MEDICINE

## 2021-01-29 PROCEDURE — 91300 HC SARSCOV02 VAC 30MCG/0.3ML IM: CPT | Performed by: FAMILY MEDICINE

## 2021-02-25 ENCOUNTER — IMMUNIZATION (OUTPATIENT)
Dept: VACCINE CLINIC | Facility: HOSPITAL | Age: 86
End: 2021-02-25

## 2021-02-25 PROCEDURE — 0002A: CPT | Performed by: INTERNAL MEDICINE

## 2021-02-25 PROCEDURE — 91300 HC SARSCOV02 VAC 30MCG/0.3ML IM: CPT | Performed by: INTERNAL MEDICINE

## 2022-08-12 ENCOUNTER — TRANSCRIBE ORDERS (OUTPATIENT)
Dept: ADMINISTRATIVE | Facility: HOSPITAL | Age: 87
End: 2022-08-12

## 2022-08-12 DIAGNOSIS — I50.9 HEART FAILURE, UNSPECIFIED HF CHRONICITY, UNSPECIFIED HEART FAILURE TYPE: Primary | ICD-10-CM

## 2022-08-22 ENCOUNTER — TRANSCRIBE ORDERS (OUTPATIENT)
Dept: ADMINISTRATIVE | Facility: HOSPITAL | Age: 87
End: 2022-08-22

## 2022-08-22 DIAGNOSIS — I25.10 DISEASE OF CARDIOVASCULAR SYSTEM: ICD-10-CM

## 2022-08-22 DIAGNOSIS — R06.02 SHORTNESS OF BREATH: ICD-10-CM

## 2022-08-22 DIAGNOSIS — R00.1 SEVERE SINUS BRADYCARDIA: Primary | ICD-10-CM

## 2022-09-22 ENCOUNTER — APPOINTMENT (OUTPATIENT)
Dept: CARDIOLOGY | Facility: HOSPITAL | Age: 87
End: 2022-09-22

## 2022-10-14 ENCOUNTER — HOSPITAL ENCOUNTER (OUTPATIENT)
Dept: CARDIOLOGY | Facility: HOSPITAL | Age: 87
Discharge: HOME | End: 2022-10-14
Admitting: INTERNAL MEDICINE

## 2022-10-14 DIAGNOSIS — R06.02 SHORTNESS OF BREATH: ICD-10-CM

## 2022-10-14 DIAGNOSIS — R00.1 SEVERE SINUS BRADYCARDIA: ICD-10-CM

## 2022-10-14 DIAGNOSIS — I25.10 DISEASE OF CARDIOVASCULAR SYSTEM: ICD-10-CM

## 2022-10-14 LAB
BH CV ECHO MEAS - AO ROOT DIAM: 3.2 CM
BH CV ECHO MEAS - EDV(CUBED): 91.1 ML
BH CV ECHO MEAS - EDV(MOD-SP4): 57.5 ML
BH CV ECHO MEAS - EF(MOD-SP4): 68 %
BH CV ECHO MEAS - ESV(CUBED): 74.1 ML
BH CV ECHO MEAS - ESV(MOD-SP4): 18.4 ML
BH CV ECHO MEAS - FS: 6.7 %
BH CV ECHO MEAS - IVS/LVPW: 0.92 CM
BH CV ECHO MEAS - IVSD: 1.2 CM
BH CV ECHO MEAS - LA DIMENSION: 5.2 CM
BH CV ECHO MEAS - LAT PEAK E' VEL: 6.7 CM/SEC
BH CV ECHO MEAS - LV DIASTOLIC VOL/BSA (35-75): 30 CM2
BH CV ECHO MEAS - LV MASS(C)D: 210.2 GRAMS
BH CV ECHO MEAS - LV SYSTOLIC VOL/BSA (12-30): 9.6 CM2
BH CV ECHO MEAS - LVIDD: 4.5 CM
BH CV ECHO MEAS - LVIDS: 4.2 CM
BH CV ECHO MEAS - LVOT AREA: 3.5 CM2
BH CV ECHO MEAS - LVOT DIAM: 2.1 CM
BH CV ECHO MEAS - LVPWD: 1.3 CM
BH CV ECHO MEAS - MED PEAK E' VEL: 5.4 CM/SEC
BH CV ECHO MEAS - MV A MAX VEL: 110 CM/SEC
BH CV ECHO MEAS - MV E MAX VEL: 104 CM/SEC
BH CV ECHO MEAS - MV E/A: 0.95
BH CV ECHO MEAS - PA ACC TIME: 0.11 SEC
BH CV ECHO MEAS - PA PR(ACCEL): 30 MMHG
BH CV ECHO MEAS - RAP SYSTOLE: 10 MMHG
BH CV ECHO MEAS - RVSP: 34.4 MMHG
BH CV ECHO MEAS - SI(MOD-SP4): 20.4 ML/M2
BH CV ECHO MEAS - SV(MOD-SP4): 39.1 ML
BH CV ECHO MEAS - TAPSE (>1.6): 1.92 CM
BH CV ECHO MEAS - TR MAX PG: 24.4 MMHG
BH CV ECHO MEAS - TR MAX VEL: 243.5 CM/SEC
BH CV ECHO MEASUREMENTS AVERAGE E/E' RATIO: 17.19
LEFT ATRIUM VOLUME INDEX: 30.7 ML/M2
MAXIMAL PREDICTED HEART RATE: 130 BPM
STRESS TARGET HR: 111 BPM

## 2022-10-14 PROCEDURE — 93306 TTE W/DOPPLER COMPLETE: CPT

## 2022-10-27 ENCOUNTER — HOSPITAL ENCOUNTER (OUTPATIENT)
Dept: GENERAL RADIOLOGY | Facility: HOSPITAL | Age: 87
Discharge: HOME | End: 2022-10-27
Admitting: INTERNAL MEDICINE

## 2022-10-27 ENCOUNTER — TRANSCRIBE ORDERS (OUTPATIENT)
Dept: ADMINISTRATIVE | Facility: HOSPITAL | Age: 87
End: 2022-10-27

## 2022-10-27 DIAGNOSIS — R06.00 DYSPNEA, UNSPECIFIED TYPE: Primary | ICD-10-CM

## 2022-10-27 PROCEDURE — 71046 X-RAY EXAM CHEST 2 VIEWS: CPT | Performed by: RADIOLOGY

## 2022-10-27 PROCEDURE — 71046 X-RAY EXAM CHEST 2 VIEWS: CPT

## 2022-10-28 ENCOUNTER — TRANSCRIBE ORDERS (OUTPATIENT)
Dept: ADMINISTRATIVE | Facility: HOSPITAL | Age: 87
End: 2022-10-28

## 2022-10-28 DIAGNOSIS — R06.02 SHORTNESS OF BREATH: Primary | ICD-10-CM

## 2022-10-31 ENCOUNTER — HOSPITAL ENCOUNTER (OUTPATIENT)
Dept: RESPIRATORY THERAPY | Facility: HOSPITAL | Age: 87
Discharge: HOME | End: 2022-10-31
Admitting: INTERNAL MEDICINE

## 2022-10-31 DIAGNOSIS — R06.02 SHORTNESS OF BREATH: ICD-10-CM

## 2022-10-31 PROCEDURE — 94010 BREATHING CAPACITY TEST: CPT

## 2022-10-31 PROCEDURE — 94010 BREATHING CAPACITY TEST: CPT | Performed by: INTERNAL MEDICINE

## 2023-02-20 ENCOUNTER — HOSPITAL ENCOUNTER (OUTPATIENT)
Dept: GENERAL RADIOLOGY | Facility: HOSPITAL | Age: 88
Discharge: HOME | End: 2023-02-20
Admitting: INTERNAL MEDICINE
Payer: MEDICARE

## 2023-02-20 ENCOUNTER — TRANSCRIBE ORDERS (OUTPATIENT)
Dept: ADMINISTRATIVE | Facility: HOSPITAL | Age: 88
End: 2023-02-20
Payer: MEDICARE

## 2023-02-20 DIAGNOSIS — R07.81 PLEURITIC CHEST PAIN: ICD-10-CM

## 2023-02-20 DIAGNOSIS — R07.81 RIB PAIN: ICD-10-CM

## 2023-02-20 DIAGNOSIS — R07.81 PLEURITIC CHEST PAIN: Primary | ICD-10-CM

## 2023-02-20 PROCEDURE — 71100 X-RAY EXAM RIBS UNI 2 VIEWS: CPT

## 2023-02-20 PROCEDURE — 71046 X-RAY EXAM CHEST 2 VIEWS: CPT | Performed by: RADIOLOGY

## 2023-02-20 PROCEDURE — 71046 X-RAY EXAM CHEST 2 VIEWS: CPT

## 2023-02-20 PROCEDURE — 71100 X-RAY EXAM RIBS UNI 2 VIEWS: CPT | Performed by: RADIOLOGY

## 2023-04-10 ENCOUNTER — OFFICE VISIT (OUTPATIENT)
Dept: ORTHOPEDIC SURGERY | Facility: CLINIC | Age: 88
End: 2023-04-10
Payer: MEDICARE

## 2023-04-10 VITALS
HEIGHT: 64 IN | WEIGHT: 190 LBS | BODY MASS INDEX: 32.44 KG/M2 | SYSTOLIC BLOOD PRESSURE: 88 MMHG | DIASTOLIC BLOOD PRESSURE: 46 MMHG | HEART RATE: 79 BPM

## 2023-04-10 DIAGNOSIS — M65.342 TRIGGER FINGER, LEFT RING FINGER: Primary | ICD-10-CM

## 2023-04-10 RX ORDER — OLMESARTAN MEDOXOMIL 40 MG/1
TABLET ORAL
COMMUNITY
Start: 2023-04-03

## 2023-04-10 RX ORDER — HYDROCODONE BITARTRATE AND ACETAMINOPHEN 5; 325 MG/1; MG/1
TABLET ORAL
COMMUNITY
Start: 2023-03-02

## 2023-04-10 RX ORDER — MELOXICAM 7.5 MG/1
TABLET ORAL
COMMUNITY
Start: 2023-02-20

## 2023-04-10 RX ORDER — ROSUVASTATIN CALCIUM 10 MG/1
TABLET, COATED ORAL
COMMUNITY
Start: 2023-04-03

## 2023-04-10 RX ORDER — ALBUTEROL SULFATE 2.5 MG/3ML
SOLUTION RESPIRATORY (INHALATION)
COMMUNITY
Start: 2023-04-03

## 2023-04-10 RX ADMIN — LIDOCAINE HYDROCHLORIDE 5 ML: 20 INJECTION, SOLUTION INFILTRATION; PERINEURAL at 15:23

## 2023-04-10 RX ADMIN — METHYLPREDNISOLONE ACETATE 40 MG: 40 INJECTION, SUSPENSION INTRA-ARTICULAR; INTRALESIONAL; INTRAMUSCULAR; SOFT TISSUE at 15:23

## 2023-04-10 NOTE — PROGRESS NOTES
Follow-up Visit         Patient: Chanell Allen  YOB: 1932  Date of Encounter: 04/10/2023      Chief  Complaint:   Chief Complaint   Patient presents with   • Left Hand - Initial Evaluation, Pain         HPI:  Chanell Allen, 89 y.o. female presents for evaluation of painful locking of her left fourth finger.  She has had numerous trigger finger releases in the past and most recently had an injection to her left hand fifth finger several years ago her symptoms resolved.  Symptoms have been present for several months.  She denies weakness or numbness to her left hand fourth finger and has no other finger involvement.        Medical History:  Patient Active Problem List   Diagnosis   • Coronary artery disease   • HLD (hyperlipidemia)   • BP (high blood pressure)   • Adult hypothyroidism   • Right hand pain   • Trigger middle finger of right hand     Past Medical History:   Diagnosis Date   • Acid reflux    • Anemia    • CHF (congestive heart failure)    • COPD (chronic obstructive pulmonary disease)    • Coronary artery disease    • Diabetes    • History of transfusion    • Hypertension    • Osteoarthritis    • Rheumatoid arthritis    • Thyroid disease    • Trigger finger, right middle finger          Social History:  Social History     Socioeconomic History   • Marital status:    Tobacco Use   • Smoking status: Never   • Smokeless tobacco: Never   Vaping Use   • Vaping Use: Never used   Substance and Sexual Activity   • Alcohol use: No   • Drug use: No   • Sexual activity: Defer         Current Medications:    Current Outpatient Medications:   •  albuterol (PROVENTIL) (2.5 MG/3ML) 0.083% nebulizer solution, , Disp: , Rfl:   •  aspirin 81 MG EC tablet, Take 1 tablet by mouth Daily., Disp: , Rfl:   •  clopidogrel (PLAVIX) 75 MG tablet, 1 tablet Daily., Disp: , Rfl:   •  COREG CR 20 MG 24 hr capsule, 1 capsule Every Night., Disp: , Rfl:   •  flecainide (TAMBOCOR) 50 MG tablet, 1 tablet 2 (Two)  Times a Day., Disp: , Rfl:   •  HYDROcodone-acetaminophen (NORCO) 5-325 MG per tablet, , Disp: , Rfl:   •  losartan (COZAAR) 100 MG tablet, 1 tablet Daily., Disp: , Rfl:   •  meloxicam (MOBIC) 7.5 MG tablet, , Disp: , Rfl:   •  montelukast (SINGULAIR) 10 MG tablet, 1 tablet Every Night., Disp: , Rfl:   •  olmesartan (BENICAR) 40 MG tablet, , Disp: , Rfl:   •  rosuvastatin (CRESTOR) 10 MG tablet, , Disp: , Rfl:   •  spironolactone (ALDACTONE) 25 MG tablet, 12.5 mg Daily., Disp: , Rfl:   •  SYNTHROID 175 MCG tablet, 1 tablet Daily., Disp: , Rfl:   •  vitamin D (ERGOCALCIFEROL) 94136 units capsule capsule, Take 1 capsule by mouth 1 (One) Time Per Week., Disp: , Rfl:       Allergies:  Allergies   Allergen Reactions   • Ciprofloxacin Hcl Hives         Family History:  Family History   Problem Relation Age of Onset   • Osteoporosis Mother    • Rheumatologic disease Sister    • Heart disease Maternal Grandfather          Surgical History:  Past Surgical History:   Procedure Laterality Date   • ABDOMINAL SURGERY     • CARPAL TUNNEL RELEASE     • CHOLECYSTECTOMY     • EYE SURGERY Bilateral     cataracts   • HIATAL HERNIA REPAIR     • HYSTERECTOMY     • JOINT REPLACEMENT     • KNEE ARTHROSCOPY Left    • SKIN BIOPSY     • THYROID SURGERY     • TOTAL HIP ARTHROPLASTY Right    • TRIGGER FINGER RELEASE Right 10/13/2016    Procedure: RIGHT FOURTH TRIGGER FINGER RELEASE;  Surgeon: Edwin Solis MD;  Location: [facility];  Service:    • TRIGGER FINGER RELEASE Right 11/2/2017    Procedure:  RIGHT MIDDLE TRIGGER FINGER RELEASE;  Surgeon: Edwin Solis MD;  Location: [facility] OR;  Service:          Orthopedic Examination: Left hand reveals tenderness in her palm in the region of the A1 pulley fourth finger with obvious locking.  Sensation is intact.        Assessment & Plan:   89 y.o. female presents with left hand trigger fourth finger we discussed options she was then provided steroid injection Depo-Medrol 40 mg  "lidocaine block within the fourth finger flexor tendon sheath she had complete relief.  She will return in the future as needed depending on her symptoms.         Diagnosis Plan   1. Trigger finger, left ring finger              - Injection Tendon or Ligament    Date/Time: 4/10/2023 3:23 PM  Performed by: Edwin Solis MD  Authorized by: Edwin Solis MD   Consent: Verbal consent obtained. Written consent obtained.  Risks and benefits: risks, benefits and alternatives were discussed  Consent given by: patient  Patient understanding: patient states understanding of the procedure being performed  Patient consent: the patient's understanding of the procedure matches consent given  Procedure consent: procedure consent matches procedure scheduled  Test results: test results available and properly labeled  Site marked: the operative site was marked  Imaging studies: imaging studies available  Patient identity confirmed: verbally with patient  Time out: Immediately prior to procedure a \"time out\" was called to verify the correct patient, procedure, equipment, support staff and site/side marked as required.  Preparation: Patient was prepped and draped in the usual sterile fashion.  Local anesthesia used: yes  Anesthesia: local infiltration    Anesthesia:  Local anesthesia used: yes  Anesthetic total: 5 mL    Sedation:  Patient sedated: no    Patient tolerance: Patient tolerated the procedure well with no immediate complications  Medications administered: 5 mL lidocaine 2%; 40 mg methylPREDNISolone acetate 40 MG/ML              Cc:  Stella Khan MD              This document has been electronically signed by Edwin Solis MD   April 12, 2023 15:22 EDT  "

## 2023-04-12 RX ORDER — LIDOCAINE HYDROCHLORIDE 20 MG/ML
5 INJECTION, SOLUTION INFILTRATION; PERINEURAL
Status: COMPLETED | OUTPATIENT
Start: 2023-04-10 | End: 2023-04-10

## 2023-04-12 RX ORDER — METHYLPREDNISOLONE ACETATE 40 MG/ML
40 INJECTION, SUSPENSION INTRA-ARTICULAR; INTRALESIONAL; INTRAMUSCULAR; SOFT TISSUE
Status: COMPLETED | OUTPATIENT
Start: 2023-04-10 | End: 2023-04-10

## 2023-05-03 ENCOUNTER — TRANSCRIBE ORDERS (OUTPATIENT)
Dept: ADMINISTRATIVE | Facility: HOSPITAL | Age: 88
End: 2023-05-03
Payer: MEDICARE

## 2023-05-03 DIAGNOSIS — R42 DIZZINESS AND GIDDINESS: Primary | ICD-10-CM

## 2023-05-03 DIAGNOSIS — E04.9 ENLARGEMENT OF THYROID: ICD-10-CM

## 2023-05-17 ENCOUNTER — HOSPITAL ENCOUNTER (OUTPATIENT)
Dept: CT IMAGING | Facility: HOSPITAL | Age: 88
Discharge: HOME | End: 2023-05-17
Payer: MEDICARE

## 2023-05-17 ENCOUNTER — TRANSCRIBE ORDERS (OUTPATIENT)
Dept: ADMINISTRATIVE | Facility: HOSPITAL | Age: 88
End: 2023-05-17
Payer: MEDICARE

## 2023-05-17 ENCOUNTER — HOSPITAL ENCOUNTER (OUTPATIENT)
Dept: GENERAL RADIOLOGY | Facility: HOSPITAL | Age: 88
Discharge: HOME | End: 2023-05-17
Payer: MEDICARE

## 2023-05-17 ENCOUNTER — HOSPITAL ENCOUNTER (OUTPATIENT)
Dept: ULTRASOUND IMAGING | Facility: HOSPITAL | Age: 88
Discharge: HOME | End: 2023-05-17
Payer: MEDICARE

## 2023-05-17 DIAGNOSIS — R42 DIZZINESS AND GIDDINESS: ICD-10-CM

## 2023-05-17 DIAGNOSIS — R10.9 ABDOMINAL PAIN, UNSPECIFIED ABDOMINAL LOCATION: ICD-10-CM

## 2023-05-17 DIAGNOSIS — R06.02 SHORTNESS OF BREATH: ICD-10-CM

## 2023-05-17 DIAGNOSIS — E04.9 ENLARGEMENT OF THYROID: ICD-10-CM

## 2023-05-17 DIAGNOSIS — R10.9 ABDOMINAL PAIN, UNSPECIFIED ABDOMINAL LOCATION: Primary | ICD-10-CM

## 2023-05-17 PROCEDURE — 74018 RADEX ABDOMEN 1 VIEW: CPT | Performed by: RADIOLOGY

## 2023-05-17 PROCEDURE — 76536 US EXAM OF HEAD AND NECK: CPT

## 2023-05-17 PROCEDURE — 70450 CT HEAD/BRAIN W/O DYE: CPT

## 2023-05-17 PROCEDURE — 71046 X-RAY EXAM CHEST 2 VIEWS: CPT

## 2023-05-17 PROCEDURE — 74018 RADEX ABDOMEN 1 VIEW: CPT

## 2025-08-25 ENCOUNTER — TRANSCRIBE ORDERS (OUTPATIENT)
Dept: ADMINISTRATIVE | Facility: HOSPITAL | Age: OVER 89
End: 2025-08-25
Payer: MEDICARE

## 2025-08-25 DIAGNOSIS — I50.9 HEART FAILURE, UNSPECIFIED HF CHRONICITY, UNSPECIFIED HEART FAILURE TYPE: Primary | ICD-10-CM

## (undated) DEVICE — ENCORE® LATEX MICRO SIZE 7.5, STERILE LATEX POWDER-FREE SURGICAL GLOVE: Brand: ENCORE

## (undated) DEVICE — SUT ETHLN 3/0 FS1 663G

## (undated) DEVICE — UNDERCAST PADDING: Brand: DEROYAL

## (undated) DEVICE — DRSNG WND GZ CURAD OIL EMULSION 3X3IN STRL

## (undated) DEVICE — BNDG ELAS CO-FLEX SLF ADHR 2IN 5YD LF STRL

## (undated) DEVICE — HOLDER: Brand: DEROYAL

## (undated) DEVICE — SPNG GZ STRL 2S 4X4 12PLY

## (undated) DEVICE — PK EXTREM UPPR 70

## (undated) DEVICE — SUT MNCRYL 4/0 PS2 18 IN